# Patient Record
Sex: FEMALE | Race: WHITE
[De-identification: names, ages, dates, MRNs, and addresses within clinical notes are randomized per-mention and may not be internally consistent; named-entity substitution may affect disease eponyms.]

---

## 2020-02-14 ENCOUNTER — HOSPITAL ENCOUNTER (EMERGENCY)
Dept: HOSPITAL 50 - VM.ED | Age: 70
Discharge: HOME | End: 2020-02-14
Payer: MEDICARE

## 2020-02-14 VITALS — SYSTOLIC BLOOD PRESSURE: 176 MMHG | DIASTOLIC BLOOD PRESSURE: 92 MMHG | HEART RATE: 112 BPM

## 2020-02-14 DIAGNOSIS — K21.9: ICD-10-CM

## 2020-02-14 DIAGNOSIS — Z88.1: ICD-10-CM

## 2020-02-14 DIAGNOSIS — Z79.01: ICD-10-CM

## 2020-02-14 DIAGNOSIS — J06.9: Primary | ICD-10-CM

## 2020-02-14 DIAGNOSIS — Z86.718: ICD-10-CM

## 2020-02-14 DIAGNOSIS — L03.116: ICD-10-CM

## 2020-02-14 DIAGNOSIS — Z88.8: ICD-10-CM

## 2020-02-14 DIAGNOSIS — Z79.899: ICD-10-CM

## 2020-02-14 LAB
ANION GAP SERPL CALC-SCNC: 13.7 MMOL/L (ref 10–20)
CHLORIDE SERPL-SCNC: 102 MMOL/L (ref 98–107)
SODIUM SERPL-SCNC: 138 MMOL/L (ref 136–145)

## 2020-02-14 NOTE — CR
______________________________________________________________________________   

  

[id number] RAD/RAD Chest PA And Lateral  

EXAM:   

   

 RAD Chest PA And Lateral  

   

 CLINICAL DATA:   

   

 1 WEEK HISTORY OF COUGH  

   

 COMPARISON:   

   

 NO PREVIOUS SIMILAR EXAM IS AVAILABLE.  

   

 FINDINGS:   

   

 The lungs are clear  

   

 The cardiac silhouette is enlarged  

   

 There is a moderate hiatal hernia  

   

 Scoliosis is seen  

   

 There are surgical changes of the right shoulder  

   

 IMPRESSION:  

   

 NO ACUTE PROCESS.  

   

 Electronically signed by Blaise Julian MD on 2/14/2020 7:56 AM  

   

  

Blaise Julian MD                 

 02/14/20 0759    

  

Thank you for allowing us to participate in the care of your patient.
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
## 2020-02-14 NOTE — CR
______________________________________________________________________________   

  

[id number] RAD/RAD Knee Left 1-2V  

EXAM:   

   

 RAD Knee Left 1-2V  

   

 CLINICAL DATA:   

   

 SWELLING AND ERYTHEMA  

   

 COMPARISON:   

   

 NO PREVIOUS SIMILAR EXAM IS AVAILABLE.  

   

 FINDINGS:   

   

 Diffuse soft tissue swelling is seen  

   

 The left knee prosthesis appears intact  

   

 No fracture or dislocation is identified  

   

 There is no air in the soft tissues  

   

 There is no plain film evidence of osteomyelitis.  

   

 IMPRESSION:  

   

 DIFFUSE SOFT TISSUE SWELLING  

   

 Electronically signed by Blaise Julian MD on 2/14/2020 7:56 AM  

   

  

Blaise Julian MD                 

 02/14/20 0758    

  

Thank you for allowing us to participate in the care of your patient.

## 2021-08-06 ENCOUNTER — HOSPITAL ENCOUNTER (OUTPATIENT)
Dept: HOSPITAL 50 - VM.SDS | Age: 71
Discharge: HOME | End: 2021-08-06
Attending: SURGERY
Payer: MEDICARE

## 2021-08-06 VITALS — SYSTOLIC BLOOD PRESSURE: 129 MMHG | DIASTOLIC BLOOD PRESSURE: 59 MMHG | HEART RATE: 69 BPM

## 2021-08-06 DIAGNOSIS — D50.9: ICD-10-CM

## 2021-08-06 DIAGNOSIS — Z80.0: ICD-10-CM

## 2021-08-06 DIAGNOSIS — K44.9: ICD-10-CM

## 2021-08-06 DIAGNOSIS — Z88.8: ICD-10-CM

## 2021-08-06 DIAGNOSIS — K63.5: ICD-10-CM

## 2021-08-06 DIAGNOSIS — D12.2: ICD-10-CM
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
## 2021-08-06 DIAGNOSIS — D12.3: ICD-10-CM

## 2021-08-06 DIAGNOSIS — Z86.010: ICD-10-CM

## 2021-08-06 DIAGNOSIS — R30.0: ICD-10-CM

## 2021-08-06 DIAGNOSIS — E66.9: ICD-10-CM

## 2021-08-06 DIAGNOSIS — S32.402D: ICD-10-CM

## 2021-08-06 DIAGNOSIS — Z87.11: ICD-10-CM

## 2021-08-06 DIAGNOSIS — Z98.890: ICD-10-CM

## 2021-08-06 DIAGNOSIS — G89.4: ICD-10-CM

## 2021-08-06 DIAGNOSIS — D12.0: ICD-10-CM

## 2021-08-06 DIAGNOSIS — E55.9: ICD-10-CM

## 2021-08-06 DIAGNOSIS — G62.89: ICD-10-CM

## 2021-08-06 DIAGNOSIS — K29.50: ICD-10-CM

## 2021-08-06 DIAGNOSIS — Z12.11: Primary | ICD-10-CM

## 2021-08-06 DIAGNOSIS — Z79.899: ICD-10-CM

## 2021-08-06 PROCEDURE — 88342 IMHCHEM/IMCYTCHM 1ST ANTB: CPT

## 2021-08-06 PROCEDURE — 45385 COLONOSCOPY W/LESION REMOVAL: CPT

## 2021-08-06 PROCEDURE — 88305 TISSUE EXAM BY PATHOLOGIST: CPT

## 2021-08-06 PROCEDURE — 43239 EGD BIOPSY SINGLE/MULTIPLE: CPT

## 2021-08-06 PROCEDURE — 00811 ANES LWR INTST NDSC NOS: CPT

## 2021-08-06 PROCEDURE — 45380 COLONOSCOPY AND BIOPSY: CPT

## 2021-08-06 PROCEDURE — 45381 COLONOSCOPY SUBMUCOUS NJX: CPT

## 2021-08-06 NOTE — OR
PREOPERATIVE DIAGNOSIS:  History of peptic ulcer disease.

 

POSTOPERATIVE DIAGNOSIS:  Approximately 6 cm sliding hiatal hernia.

 

PROCEDURE PERFORMED:  Esophagogastroduodenoscopy with biopsies.

 

ANESTHESIA:  MAC anesthesia.

 

COMPLICATIONS:  None.

 

BLOOD LOSS:  Minimal.

 

FINDINGS:

1. Unremarkable duodenum.

2. Unremarkable stomach.

3. Approximately 6 cm sliding hiatal hernia.

4. Otherwise unremarkable esophagus.

5. Z-line regular at 35 cm.

6. Biopsies were taken with cold forceps of the gastric antrum for H pylori.

 

INDICATION FOR PROCEDURE:  Belkis Poe is a  71-year-old female who has

been having some epigastric pain.  She reports a history of a bleeding gastric

ulcer 3 years ago.  She tells me she was recommended to get a repeat upper

endoscopy, although it is somewhat unclear precisely why that is.  To my

knowledge, she does not have a history of Crystal's esophagus.  At any rate,

procedure is warranted given her epigastric pain and history of peptic ulcer

disease.

 

DETAILS OF PROCEDURE:  Informed consent was obtained.  The patient was brought

to the procedure room and placed in the left lateral decubitus position.  MAC

anesthesia was induced by Anesthesia colleagues. A bite block was placed and

then the endoscope was introduced into the patient's mouth, down the upper

esophageal sphincter, into the stomach and into the duodenum.  The endoscope was

then withdrawn and a retroflexed view was obtained.  We obtained biopsies for H

pylori.  The endoscope was then slowly withdrawn.  Other than the approximately

6 cm hiatal hernia, no additional pathology was identified.

 

PATHOLOGY:

 

RECOMMENDATIONS:

 

 

RKM:  08/06/2021 10:57:08  MODL:  08/06/2021 11:49:04

Job #:  [id number]

## 2021-08-06 NOTE — OR
PREOPERATIVE DIAGNOSIS:  History of colon polyps.

 

POSTOPERATIVE DIAGNOSIS:  Colon polyps.

 

PROCEDURE PERFORMED:  Total flexible colonoscopy.

 

ANESTHESIA:  MAC anesthesia.

 

COMPLICATIONS:  None apparent.

 

BLOOD LOSS:  Minimal.

 

FINDINGS:

1. Cecal polyp, 2 mm, cold forceps.

2. Ascending colon polyp, 2 mm, cold forceps.

3. Transverse colon polyp x2, 15 mm, 4 mm, saline lift and hot snare, clip

    applied x1 to reapproximate mucosa of the large polyp.

4. Sigmoid colon polyp, 3 mm, cold forceps.

 

START TIME:  0958.

 

CECUM TIME:  1028.

 

STOP TIME:  1058.

 

BOWEL PREP:  [place] class 2.

 

INDICATION FOR PROCEDURE:  Belkis Poe is a  71-year-old female who has a

history of polyps and a family history of colorectal cancer in her dad in his

60s.  She is here for a screening colonoscopy.  She currently denies any bloody

or dark black stools.

 

DETAILS OF PROCEDURE:  Informed consent was obtained.  The patient was brought

to the procedure room and placed in the left lateral decubitus position.  MAC

anesthesia was induced by Anesthesia colleagues.  Colonoscope was introduced

into the rectum and advanced all the way to the cecum.  We had a persistent

alpha loop in the sigmoid colon which took several attempts to get reduced.  We

were careful to avoid undue pressure.  Once we were ultimately able to get this

reduced, the colonoscope was then easily advanced from the splenic flexure to

the cecum.  The terminal ileum was intubated and photographed.  Colonoscope was

then slowly withdrawn.  No pathology was identified except for what is mentioned

in the above findings section.  We then performed a retroflexed view and the

colonoscope was withdrawn.

 

PATHOLOGY:

Recommend repeat screening colonoscopy in 3 years.

 

 

RKM:  08/06/2021 10:59:41  MODL:  08/06/2021 11:37:25

Job #:  [id number]

## 2022-03-01 ENCOUNTER — HOSPITAL ENCOUNTER (INPATIENT)
Dept: HOSPITAL 50 - VM.ED | Age: 72
LOS: 6 days | Discharge: HOME | DRG: 175 | End: 2022-03-07
Attending: INTERNAL MEDICINE | Admitting: INTERNAL MEDICINE
Payer: MEDICARE

## 2022-03-01 DIAGNOSIS — I26.99: Primary | ICD-10-CM

## 2022-03-01 DIAGNOSIS — J12.82: ICD-10-CM

## 2022-03-01 DIAGNOSIS — H04.123: ICD-10-CM

## 2022-03-01 DIAGNOSIS — K44.9: ICD-10-CM

## 2022-03-01 DIAGNOSIS — Z90.49: ICD-10-CM

## 2022-03-01 DIAGNOSIS — Z88.6: ICD-10-CM

## 2022-03-01 DIAGNOSIS — Z86.718: ICD-10-CM

## 2022-03-01 DIAGNOSIS — D64.9: ICD-10-CM

## 2022-03-01 DIAGNOSIS — U07.1: ICD-10-CM

## 2022-03-01 DIAGNOSIS — U09.9: ICD-10-CM

## 2022-03-01 DIAGNOSIS — Z88.8: ICD-10-CM

## 2022-03-01 DIAGNOSIS — E66.9: ICD-10-CM

## 2022-03-01 DIAGNOSIS — Z79.01: ICD-10-CM

## 2022-03-01 DIAGNOSIS — Z96.649: ICD-10-CM

## 2022-03-01 DIAGNOSIS — Z90.710: ICD-10-CM

## 2022-03-01 DIAGNOSIS — Z96.659: ICD-10-CM

## 2022-03-01 DIAGNOSIS — G62.9: ICD-10-CM

## 2022-03-01 DIAGNOSIS — K21.9: ICD-10-CM

## 2022-03-01 DIAGNOSIS — Z98.49: ICD-10-CM

## 2022-03-01 DIAGNOSIS — Z86.16: ICD-10-CM

## 2022-03-01 DIAGNOSIS — I10: ICD-10-CM

## 2022-03-01 DIAGNOSIS — K59.09: ICD-10-CM

## 2022-03-01 DIAGNOSIS — J96.01: ICD-10-CM

## 2022-03-01 DIAGNOSIS — K29.70: ICD-10-CM

## 2022-03-01 DIAGNOSIS — G89.29: ICD-10-CM

## 2022-03-01 DIAGNOSIS — Z88.1: ICD-10-CM

## 2022-03-01 DIAGNOSIS — Z79.899: ICD-10-CM

## 2022-03-01 DIAGNOSIS — E55.9: ICD-10-CM

## 2022-03-01 LAB
ANION GAP SERPL CALC-SCNC: 14.7 MMOL/L (ref 5–15)
APTT PPP: 28.2 SEC (ref 20.5–30.9)
CHLORIDE SERPL-SCNC: 103 MMOL/L (ref 98–107)
PCO2 BLDA: 37 MMHG (ref 35–48)
RSV RNA UPPER RESP QL NAA+PROBE: NEGATIVE
SARS-COV-2 RNA RESP QL NAA+PROBE: POSITIVE
SODIUM SERPL-SCNC: 142 MMOL/L (ref 136–145)

## 2022-03-01 RX ADMIN — HYDROCODONE BITARTRATE AND ACETAMINOPHEN SCH TAB: 10; 325 TABLET ORAL at 20:11

## 2022-03-01 RX ADMIN — OMEPRAZOLE SCH MG: 20 CAPSULE, DELAYED RELEASE ORAL at 17:25

## 2022-03-01 RX ADMIN — HYDROCODONE BITARTRATE AND ACETAMINOPHEN PRN TAB: 10; 325 TABLET ORAL at 17:25

## 2022-03-02 LAB
ANION GAP SERPL CALC-SCNC: 11.6 MMOL/L (ref 5–15)
CHLORIDE SERPL-SCNC: 104 MMOL/L (ref 98–107)
SODIUM SERPL-SCNC: 141 MMOL/L (ref 136–145)

## 2022-03-02 RX ADMIN — HYDROCODONE BITARTRATE AND ACETAMINOPHEN PRN TAB: 10; 325 TABLET ORAL at 02:40

## 2022-03-02 RX ADMIN — Medication PRN ML: at 20:33

## 2022-03-02 RX ADMIN — VITAMIN D, TAB 1000IU (100/BT) SCH MCG: 25 TAB at 11:35

## 2022-03-02 RX ADMIN — OMEPRAZOLE SCH MG: 20 CAPSULE, DELAYED RELEASE ORAL at 06:28

## 2022-03-02 RX ADMIN — HYDROCODONE BITARTRATE AND ACETAMINOPHEN PRN TAB: 5; 325 TABLET ORAL at 17:22

## 2022-03-02 RX ADMIN — HYDROCODONE BITARTRATE AND ACETAMINOPHEN PRN TAB: 10; 325 TABLET ORAL at 07:50

## 2022-03-02 RX ADMIN — HYDROCODONE BITARTRATE AND ACETAMINOPHEN SCH TAB: 10; 325 TABLET ORAL at 20:31

## 2022-03-02 RX ADMIN — OMEPRAZOLE SCH MG: 20 CAPSULE, DELAYED RELEASE ORAL at 17:22

## 2022-03-03 RX ADMIN — HYDROCODONE BITARTRATE AND ACETAMINOPHEN PRN TAB: 5; 325 TABLET ORAL at 07:52

## 2022-03-03 RX ADMIN — HYDROCODONE BITARTRATE AND ACETAMINOPHEN PRN TAB: 5; 325 TABLET ORAL at 01:06

## 2022-03-03 RX ADMIN — VITAMIN D, TAB 1000IU (100/BT) SCH MCG: 25 TAB at 07:50

## 2022-03-03 RX ADMIN — OMEPRAZOLE SCH MG: 20 CAPSULE, DELAYED RELEASE ORAL at 06:13

## 2022-03-03 RX ADMIN — Medication PRN ML: at 19:44

## 2022-03-03 RX ADMIN — HYDROCODONE BITARTRATE AND ACETAMINOPHEN PRN TAB: 5; 325 TABLET ORAL at 13:06

## 2022-03-03 RX ADMIN — HYDROCODONE BITARTRATE AND ACETAMINOPHEN SCH TAB: 10; 325 TABLET ORAL at 19:38

## 2022-03-03 RX ADMIN — OMEPRAZOLE SCH MG: 20 CAPSULE, DELAYED RELEASE ORAL at 17:21

## 2022-03-03 RX ADMIN — HYDROCODONE BITARTRATE AND ACETAMINOPHEN PRN TAB: 5; 325 TABLET ORAL at 17:22

## 2022-03-04 LAB
CHLORIDE SERPL-SCNC: 104 MMOL/L (ref 98–107)
SODIUM SERPL-SCNC: 140 MMOL/L (ref 136–145)

## 2022-03-04 RX ADMIN — HYDROCODONE BITARTRATE AND ACETAMINOPHEN SCH TAB: 10; 325 TABLET ORAL at 20:02

## 2022-03-04 RX ADMIN — OMEPRAZOLE SCH MG: 20 CAPSULE, DELAYED RELEASE ORAL at 06:20

## 2022-03-04 RX ADMIN — OMEPRAZOLE SCH MG: 20 CAPSULE, DELAYED RELEASE ORAL at 16:37

## 2022-03-04 RX ADMIN — HYDROCODONE BITARTRATE AND ACETAMINOPHEN PRN TAB: 5; 325 TABLET ORAL at 09:58

## 2022-03-04 RX ADMIN — Medication PRN ML: at 20:07

## 2022-03-04 RX ADMIN — HYDROCODONE BITARTRATE AND ACETAMINOPHEN PRN TAB: 5; 325 TABLET ORAL at 04:02

## 2022-03-04 RX ADMIN — VITAMIN D, TAB 1000IU (100/BT) SCH MCG: 25 TAB at 07:58

## 2022-03-04 RX ADMIN — Medication PRN ML: at 08:08

## 2022-03-04 RX ADMIN — HYDROCODONE BITARTRATE AND ACETAMINOPHEN PRN TAB: 5; 325 TABLET ORAL at 16:37

## 2022-03-05 LAB
ANION GAP SERPL CALC-SCNC: 11 MMOL/L (ref 5–15)
CHLORIDE SERPL-SCNC: 103 MMOL/L (ref 98–107)
SODIUM SERPL-SCNC: 140 MMOL/L (ref 136–145)

## 2022-03-05 RX ADMIN — HYDROCODONE BITARTRATE AND ACETAMINOPHEN PRN TAB: 5; 325 TABLET ORAL at 14:54

## 2022-03-05 RX ADMIN — HYDROCODONE BITARTRATE AND ACETAMINOPHEN PRN TAB: 5; 325 TABLET ORAL at 08:23

## 2022-03-05 RX ADMIN — VITAMIN D, TAB 1000IU (100/BT) SCH MCG: 25 TAB at 08:24

## 2022-03-05 RX ADMIN — HYDROCODONE BITARTRATE AND ACETAMINOPHEN SCH TAB: 10; 325 TABLET ORAL at 19:36

## 2022-03-05 RX ADMIN — OMEPRAZOLE SCH MG: 20 CAPSULE, DELAYED RELEASE ORAL at 06:24

## 2022-03-05 RX ADMIN — OMEPRAZOLE SCH MG: 20 CAPSULE, DELAYED RELEASE ORAL at 17:50

## 2022-03-06 RX ADMIN — VITAMIN D, TAB 1000IU (100/BT) SCH MCG: 25 TAB at 08:03

## 2022-03-06 RX ADMIN — HYDROCODONE BITARTRATE AND ACETAMINOPHEN PRN TAB: 5; 325 TABLET ORAL at 05:34

## 2022-03-06 RX ADMIN — HYDROCODONE BITARTRATE AND ACETAMINOPHEN SCH TAB: 10; 325 TABLET ORAL at 20:18

## 2022-03-06 RX ADMIN — OMEPRAZOLE SCH MG: 20 CAPSULE, DELAYED RELEASE ORAL at 06:18

## 2022-03-06 RX ADMIN — HYDROCODONE BITARTRATE AND ACETAMINOPHEN PRN TAB: 5; 325 TABLET ORAL at 17:46

## 2022-03-06 RX ADMIN — Medication PRN ML: at 20:20

## 2022-03-06 RX ADMIN — OMEPRAZOLE SCH MG: 20 CAPSULE, DELAYED RELEASE ORAL at 17:45

## 2022-03-06 RX ADMIN — HYDROCODONE BITARTRATE AND ACETAMINOPHEN PRN TAB: 5; 325 TABLET ORAL at 13:30

## 2022-03-07 VITALS — DIASTOLIC BLOOD PRESSURE: 66 MMHG | SYSTOLIC BLOOD PRESSURE: 140 MMHG

## 2022-03-07 VITALS — HEART RATE: 84 BPM

## 2022-03-07 LAB
ANION GAP SERPL CALC-SCNC: 8.8 MMOL/L (ref 5–15)
CHLORIDE SERPL-SCNC: 102 MMOL/L (ref 98–107)
SODIUM SERPL-SCNC: 139 MMOL/L (ref 136–145)

## 2022-03-07 RX ADMIN — VITAMIN D, TAB 1000IU (100/BT) SCH MCG: 25 TAB at 08:47

## 2022-03-07 RX ADMIN — OMEPRAZOLE SCH MG: 20 CAPSULE, DELAYED RELEASE ORAL at 06:05

## 2022-03-07 RX ADMIN — HYDROCODONE BITARTRATE AND ACETAMINOPHEN PRN TAB: 5; 325 TABLET ORAL at 08:47

## 2022-03-07 RX ADMIN — HYDROCODONE BITARTRATE AND ACETAMINOPHEN PRN TAB: 5; 325 TABLET ORAL at 02:39

## 2022-03-29 ENCOUNTER — HOSPITAL ENCOUNTER (EMERGENCY)
Dept: HOSPITAL 50 - VM.ED | Age: 72
Discharge: HOME | End: 2022-03-29
Payer: MEDICARE

## 2022-03-29 VITALS — SYSTOLIC BLOOD PRESSURE: 137 MMHG | HEART RATE: 79 BPM | DIASTOLIC BLOOD PRESSURE: 66 MMHG

## 2022-03-29 DIAGNOSIS — Z88.8: ICD-10-CM

## 2022-03-29 DIAGNOSIS — F41.9: Primary | ICD-10-CM

## 2022-03-29 DIAGNOSIS — Z79.899: ICD-10-CM

## 2022-03-29 DIAGNOSIS — K21.9: ICD-10-CM

## 2022-03-29 DIAGNOSIS — E66.9: ICD-10-CM

## 2022-03-29 DIAGNOSIS — Z79.01: ICD-10-CM

## 2022-03-29 LAB
ANION GAP SERPL CALC-SCNC: 14 MMOL/L (ref 5–15)
CHLORIDE SERPL-SCNC: 103 MMOL/L (ref 98–107)
SODIUM SERPL-SCNC: 141 MMOL/L (ref 136–145)

## 2022-05-22 ENCOUNTER — HOSPITAL ENCOUNTER (EMERGENCY)
Dept: HOSPITAL 50 - VM.ED | Age: 72
Discharge: HOME | End: 2022-05-22
Payer: MEDICARE

## 2022-05-22 VITALS — HEART RATE: 78 BPM

## 2022-05-22 VITALS — DIASTOLIC BLOOD PRESSURE: 68 MMHG | SYSTOLIC BLOOD PRESSURE: 140 MMHG

## 2022-05-22 DIAGNOSIS — Z88.8: ICD-10-CM

## 2022-05-22 DIAGNOSIS — Z79.899: ICD-10-CM

## 2022-05-22 DIAGNOSIS — Z79.01: ICD-10-CM

## 2022-05-22 DIAGNOSIS — E66.9: ICD-10-CM

## 2022-05-22 DIAGNOSIS — K80.20: Primary | ICD-10-CM

## 2022-05-22 DIAGNOSIS — K21.9: ICD-10-CM

## 2022-05-22 DIAGNOSIS — Z88.1: ICD-10-CM

## 2022-05-22 LAB
ANION GAP SERPL CALC-SCNC: 13.6 MMOL/L (ref 5–15)
APTT PPP: 35.4 SEC (ref 20.5–30.9)
CHLORIDE SERPL-SCNC: 102 MMOL/L (ref 98–107)
SODIUM SERPL-SCNC: 140 MMOL/L (ref 136–145)

## 2023-10-08 ENCOUNTER — HOSPITAL ENCOUNTER (EMERGENCY)
Dept: HOSPITAL 50 - VM.ED | Age: 73
Discharge: HOME | End: 2023-10-08
Payer: MEDICARE

## 2023-10-08 VITALS — DIASTOLIC BLOOD PRESSURE: 62 MMHG | SYSTOLIC BLOOD PRESSURE: 120 MMHG

## 2023-10-08 VITALS — HEART RATE: 69 BPM

## 2023-10-08 DIAGNOSIS — Z86.16: ICD-10-CM

## 2023-10-08 DIAGNOSIS — I10: Primary | ICD-10-CM

## 2023-10-08 DIAGNOSIS — Z88.1: ICD-10-CM

## 2023-10-08 DIAGNOSIS — E66.9: ICD-10-CM

## 2023-10-08 DIAGNOSIS — K21.9: ICD-10-CM

## 2023-10-08 DIAGNOSIS — Z79.899: ICD-10-CM

## 2023-10-08 DIAGNOSIS — Z88.8: ICD-10-CM

## 2023-10-08 LAB
ALBUMIN SERPL-MCNC: 3.3 G/DL (ref 3.4–5)
ALBUMIN/GLOB SERPL: 0.94 {RATIO}
ALP SERPL-CCNC: 81 U/L (ref 46–116)
ALT SERPL-CCNC: 11 U/L (ref 14–59)
ANION GAP SERPL CALC-SCNC: 12.5 MMOL/L (ref 5–15)
AST SERPL-CCNC: 14 U/L (ref 15–37)
BASOPHILS # BLD AUTO: 0 X10^3/UL (ref 0–0.2)
BASOPHILS NFR BLD AUTO: 0.3 % (ref 0.2–1.2)
BILIRUB SERPL-MCNC: 0.4 MG/DL (ref 0.2–1)
BUN SERPL-MCNC: 15 MG/DL (ref 7–18)
CALCIUM SERPL-MCNC: 8.6 MG/DL (ref 8.5–10.1)
CHLORIDE SERPL-SCNC: 104 MMOL/L (ref 98–107)
CO2 SERPL-SCNC: 27 MMOL/L (ref 21–32)
CREAT CL 24H UR+SERPL-VRATE: 75.84 ML/MIN
CREAT SERPL-MCNC: 0.6 MG/DL (ref 0.55–1.02)
EGFRCR SERPLBLD CKD-EPI 2021: 94 ML/MIN (ref 60–?)
EOSINOPHIL # BLD AUTO: 0.1 X10^3/UL (ref 0–0.5)
EOSINOPHIL NFR BLD AUTO: 2.3 % (ref 0–4)
GLOBULIN SER-MCNC: 3.5 G/DL
GLUCOSE SERPL-MCNC: 111 MG/DL (ref 70–99)
HCT VFR BLD AUTO: 39.5 % (ref 33–47)
HGB BLD-MCNC: 12.7 G/DL (ref 12–16)
IMM GRANULOCYTES # BLD: 0 X10^3/UL (ref 0–0.07)
IMM GRANULOCYTES NFR BLD: 0 % (ref 0–0.43)
LYMPHOCYTES # BLD AUTO: 1.1 X10^3/UL (ref 1–4.8)
LYMPHOCYTES NFR BLD AUTO: 27.7 % (ref 25–50)
MCH RBC QN AUTO: 28.7 PG (ref 26–32)
MCHC RBC AUTO-ENTMCNC: 32.2 G/DL (ref 32–36)
MCHC RBC AUTO-ENTMCNC: 89.4 FL (ref 78–93)
MONOCYTES # BLD AUTO: 0.5 X10^3/UL (ref 0–0.8)
MONOCYTES NFR BLD AUTO: 11.5 % (ref 2–11)
NEUTROPHILS # BLD AUTO: 2.3 X10^3/UL (ref 1.8–7.7)
NEUTROPHILS NFR BLD AUTO: 58.2 % (ref 50–80)
PLATELET # BLD AUTO: 175 X10^3/UL (ref 130–400)
POTASSIUM SERPL-SCNC: 3.5 MMOL/L (ref 3.5–5.1)
PROT SERPL-MCNC: 6.8 G/DL (ref 6.4–8.2)
RBC # BLD AUTO: 4.42 X10^6/UL (ref 4–5.5)
SODIUM SERPL-SCNC: 140 MMOL/L (ref 136–145)
WBC # BLD AUTO: 3.9 X10^3/UL (ref 4–10)

## 2023-10-08 PROCEDURE — 36415 COLL VENOUS BLD VENIPUNCTURE: CPT

## 2023-10-08 PROCEDURE — 93005 ELECTROCARDIOGRAM TRACING: CPT

## 2023-10-08 PROCEDURE — 84484 ASSAY OF TROPONIN QUANT: CPT

## 2023-10-08 PROCEDURE — 70450 CT HEAD/BRAIN W/O DYE: CPT

## 2023-10-08 PROCEDURE — 85025 COMPLETE CBC W/AUTO DIFF WBC: CPT

## 2023-10-08 PROCEDURE — 99284 EMERGENCY DEPT VISIT MOD MDM: CPT

## 2023-10-08 PROCEDURE — 80053 COMPREHEN METABOLIC PANEL: CPT

## 2024-11-07 ENCOUNTER — HOSPITAL ENCOUNTER (OUTPATIENT)
Dept: HOSPITAL 50 - VM.SDS | Age: 74
Discharge: HOME | End: 2024-11-07
Attending: FAMILY MEDICINE
Payer: MEDICARE

## 2024-11-07 VITALS — SYSTOLIC BLOOD PRESSURE: 133 MMHG | DIASTOLIC BLOOD PRESSURE: 53 MMHG | HEART RATE: 72 BPM

## 2024-11-07 DIAGNOSIS — K63.5: ICD-10-CM

## 2024-11-07 DIAGNOSIS — Z88.6: ICD-10-CM

## 2024-11-07 DIAGNOSIS — D12.0: ICD-10-CM

## 2024-11-07 DIAGNOSIS — D12.6: ICD-10-CM

## 2024-11-07 DIAGNOSIS — Z86.0100: ICD-10-CM

## 2024-11-07 DIAGNOSIS — K57.30: ICD-10-CM

## 2024-11-07 DIAGNOSIS — Z88.8: ICD-10-CM

## 2024-11-07 DIAGNOSIS — Z12.11: Primary | ICD-10-CM

## 2025-01-21 ENCOUNTER — HOSPITAL ENCOUNTER (EMERGENCY)
Dept: HOSPITAL 50 - VM.ED | Age: 75
Discharge: HOME | End: 2025-01-21
Payer: MEDICARE

## 2025-01-21 VITALS — SYSTOLIC BLOOD PRESSURE: 164 MMHG | DIASTOLIC BLOOD PRESSURE: 87 MMHG | HEART RATE: 85 BPM

## 2025-01-21 DIAGNOSIS — I10: Primary | ICD-10-CM

## 2025-01-21 DIAGNOSIS — Z79.01: ICD-10-CM

## 2025-01-21 DIAGNOSIS — Z86.16: ICD-10-CM

## 2025-01-21 DIAGNOSIS — Z88.6: ICD-10-CM

## 2025-01-21 DIAGNOSIS — E66.9: ICD-10-CM

## 2025-01-21 DIAGNOSIS — Z79.899: ICD-10-CM

## 2025-01-21 DIAGNOSIS — B37.89: ICD-10-CM

## 2025-01-21 DIAGNOSIS — T45.1X5A: ICD-10-CM

## 2025-01-21 DIAGNOSIS — Z88.8: ICD-10-CM

## 2025-01-21 DIAGNOSIS — Z88.1: ICD-10-CM

## 2025-01-21 DIAGNOSIS — K21.9: ICD-10-CM

## 2025-01-21 LAB
ALBUMIN SERPL-MCNC: 3.7 G/DL (ref 3.4–5)
ALBUMIN/GLOB SERPL: 1 {RATIO}
ALP SERPL-CCNC: 97 U/L (ref 46–116)
ALT SERPL-CCNC: 20 U/L (ref 14–59)
ANION GAP SERPL CALC-SCNC: 13 MMOL/L (ref 5–15)
AST SERPL-CCNC: 16 U/L (ref 15–37)
BASOPHILS # BLD AUTO: 0 X10^3/UL (ref 0–0.2)
BASOPHILS NFR BLD AUTO: 0.2 % (ref 0.2–1.2)
BILIRUB SERPL-MCNC: 0.2 MG/DL (ref 0.2–1)
BUN SERPL-MCNC: 22 MG/DL (ref 7–18)
CALCIUM SERPL-MCNC: 9.1 MG/DL (ref 8.5–10.1)
CHLORIDE SERPL-SCNC: 104 MMOL/L (ref 98–107)
CO2 SERPL-SCNC: 29 MMOL/L (ref 21–32)
CREAT CL 24H UR+SERPL-VRATE: 58.18 ML/MIN
CREAT SERPL-MCNC: 0.8 MG/DL (ref 0.55–1.02)
EGFRCR SERPLBLD CKD-EPI 2021: 76 ML/MIN (ref 60–?)
EOSINOPHIL # BLD AUTO: 0.1 X10^3/UL (ref 0–0.5)
EOSINOPHIL NFR BLD AUTO: 1.6 % (ref 0–4)
GLOBULIN SER-MCNC: 3.7 G/DL
GLUCOSE SERPL-MCNC: 108 MG/DL (ref 70–99)
HCT VFR BLD AUTO: 39.5 % (ref 33–47)
HGB BLD-MCNC: 13 G/DL (ref 12–16)
IMM GRANULOCYTES # BLD: 0.01 X10^3/UL (ref 0–0.07)
IMM GRANULOCYTES NFR BLD: 0.2 % (ref 0–0.43)
LYMPHOCYTES # BLD AUTO: 1.2 X10^3/UL (ref 1–4.8)
LYMPHOCYTES NFR BLD AUTO: 22.5 % (ref 25–50)
MCH RBC QN AUTO: 29.3 PG (ref 26–32)
MCHC RBC AUTO-ENTMCNC: 32.9 G/DL (ref 32–36)
MCHC RBC AUTO-ENTMCNC: 89 FL (ref 78–93)
MONOCYTES # BLD AUTO: 0.6 X10^3/UL (ref 0–0.8)
MONOCYTES NFR BLD AUTO: 10 % (ref 2–11)
NEUTROPHILS # BLD AUTO: 3.6 X10^3/UL (ref 1.8–7.7)
NEUTROPHILS NFR BLD AUTO: 65.5 % (ref 50–80)
PLATELET # BLD AUTO: 169 X10^3/UL (ref 130–400)
POTASSIUM SERPL-SCNC: 4 MMOL/L (ref 3.5–5.1)
PROT SERPL-MCNC: 7.4 G/DL (ref 6.4–8.2)
RBC # BLD AUTO: 4.44 X10^6/UL (ref 4–5.5)
SODIUM SERPL-SCNC: 142 MMOL/L (ref 136–145)
WBC # BLD AUTO: 5.5 X10^3/UL (ref 4–10)

## 2025-05-07 ENCOUNTER — HOSPITAL ENCOUNTER (EMERGENCY)
Dept: HOSPITAL 50 - VM.ED | Age: 75
Discharge: HOME | End: 2025-05-07
Payer: MEDICARE

## 2025-05-07 VITALS — HEART RATE: 89 BPM | SYSTOLIC BLOOD PRESSURE: 170 MMHG | DIASTOLIC BLOOD PRESSURE: 89 MMHG

## 2025-05-07 DIAGNOSIS — Z88.6: ICD-10-CM

## 2025-05-07 DIAGNOSIS — K08.89: Primary | ICD-10-CM

## 2025-05-07 DIAGNOSIS — Z88.8: ICD-10-CM

## 2025-05-07 DIAGNOSIS — Z88.1: ICD-10-CM

## 2025-05-07 DIAGNOSIS — Z79.899: ICD-10-CM

## 2025-05-07 DIAGNOSIS — Z88.0: ICD-10-CM

## 2025-05-07 DIAGNOSIS — Z79.01: ICD-10-CM
